# Patient Record
Sex: FEMALE | Race: WHITE | NOT HISPANIC OR LATINO | Employment: FULL TIME | URBAN - METROPOLITAN AREA
[De-identification: names, ages, dates, MRNs, and addresses within clinical notes are randomized per-mention and may not be internally consistent; named-entity substitution may affect disease eponyms.]

---

## 2021-02-28 ENCOUNTER — CLINICAL SUPPORT (OUTPATIENT)
Dept: URGENT CARE | Facility: CLINIC | Age: 21
End: 2021-02-28
Payer: COMMERCIAL

## 2021-02-28 DIAGNOSIS — U07.1 COVID-19 VIRUS DETECTED: ICD-10-CM

## 2021-02-28 DIAGNOSIS — Z20.822 EXPOSURE TO COVID-19 VIRUS: Primary | ICD-10-CM

## 2021-02-28 LAB
CTP QC/QA: YES
SARS-COV-2 RDRP RESP QL NAA+PROBE: POSITIVE

## 2021-02-28 PROCEDURE — U0002 COVID-19 LAB TEST NON-CDC: HCPCS | Mod: QW,S$GLB,, | Performed by: INTERNAL MEDICINE

## 2021-02-28 PROCEDURE — U0002: ICD-10-PCS | Mod: QW,S$GLB,, | Performed by: INTERNAL MEDICINE

## 2021-10-18 PROBLEM — N89.8 VAGINAL DISCHARGE: Status: ACTIVE | Noted: 2021-10-18

## 2021-10-18 PROBLEM — Z11.3 SCREENING FOR STD (SEXUALLY TRANSMITTED DISEASE): Status: ACTIVE | Noted: 2021-10-18

## 2021-12-01 PROBLEM — A74.9 CHLAMYDIA: Status: ACTIVE | Noted: 2021-12-01

## 2021-12-01 PROBLEM — N89.8 VAGINAL DISCHARGE: Status: RESOLVED | Noted: 2021-10-18 | Resolved: 2021-12-01

## 2022-03-21 ENCOUNTER — HOSPITAL ENCOUNTER (EMERGENCY)
Facility: HOSPITAL | Age: 22
Discharge: HOME | End: 2022-03-21
Attending: EMERGENCY MEDICINE
Payer: COMMERCIAL

## 2022-03-21 VITALS
HEART RATE: 94 BPM | OXYGEN SATURATION: 99 % | RESPIRATION RATE: 18 BRPM | DIASTOLIC BLOOD PRESSURE: 61 MMHG | HEIGHT: 59 IN | TEMPERATURE: 98 F | WEIGHT: 129.88 LBS | BODY MASS INDEX: 26.18 KG/M2 | SYSTOLIC BLOOD PRESSURE: 128 MMHG

## 2022-03-21 DIAGNOSIS — S09.90XA INJURY OF HEAD, INITIAL ENCOUNTER: Primary | ICD-10-CM

## 2022-03-21 PROCEDURE — 99283 EMERGENCY DEPT VISIT LOW MDM: CPT

## 2022-03-21 PROCEDURE — 25000003 PHARM REV CODE 250: Performed by: PHYSICIAN ASSISTANT

## 2022-03-21 RX ORDER — BUTALBITAL, ACETAMINOPHEN AND CAFFEINE 50; 325; 40 MG/1; MG/1; MG/1
2 TABLET ORAL
Status: COMPLETED | OUTPATIENT
Start: 2022-03-21 | End: 2022-03-21

## 2022-03-21 RX ORDER — METOCLOPRAMIDE 5 MG/1
10 TABLET ORAL
Status: COMPLETED | OUTPATIENT
Start: 2022-03-21 | End: 2022-03-21

## 2022-03-21 RX ORDER — BUTALBITAL, ACETAMINOPHEN AND CAFFEINE 50; 325; 40 MG/1; MG/1; MG/1
2 TABLET ORAL EVERY 8 HOURS PRN
Qty: 18 TABLET | Refills: 0 | Status: SHIPPED | OUTPATIENT
Start: 2022-03-21 | End: 2022-04-20

## 2022-03-21 RX ADMIN — BUTALBITAL, ACETAMINOPHEN AND CAFFEINE 2 TABLET: 50; 325; 40 TABLET ORAL at 12:03

## 2022-03-21 RX ADMIN — METOCLOPRAMIDE 10 MG: 5 TABLET ORAL at 12:03

## 2022-03-21 NOTE — ED PROVIDER NOTES
History      Chief Complaint   Patient presents with    Head Injury     Pt bent over to  a chair and hit head on soap dispenser. No loss of consciousness, but nauseated and dizzy.       Review of patient's allergies indicates:  No Known Allergies     HPI   HPI    3/21/2022, 12:34 PM   History obtained from the patient      History of Present Illness: Renay Nix is a 21 y.o. female patient who presents to the Emergency Department for check up since head injury .  She bent over to  a chair and hit her head on a soap dispenser.  Denies LOC and vomiting.  Admits to nausea.  Symptoms are moderate in severity.     No further complaints or concerns at this time.           PCP: Primary Doctor No       Past Medical History:  Past Medical History:   Diagnosis Date    Chlamydia          Past Surgical History:  History reviewed. No pertinent surgical history.        Family History:  Family History   Adopted: Yes   Family history unknown: Yes           Social History:  Social History     Tobacco Use    Smoking status: Never Smoker    Smokeless tobacco: Never Used   Substance and Sexual Activity    Alcohol use: Not Currently     Comment: occ    Drug use: Not Currently    Sexual activity: Yes     Partners: Male       ROS   Review of Systems   Constitutional: Negative for activity change, chills and fever.   HENT: Negative for rhinorrhea and trouble swallowing.    Eyes: Negative for pain and visual disturbance.   Respiratory: Negative for cough and shortness of breath.    Cardiovascular: Negative for chest pain.   Gastrointestinal: Negative for nausea and vomiting.   Genitourinary: Negative for dysuria.   Musculoskeletal: Negative for gait problem and neck stiffness.   Skin: Negative for pallor and rash.   Neurological: Negative for facial asymmetry, speech difficulty and weakness.   Hematological: Does not bruise/bleed easily.   All other systems reviewed and are negative.    Review of  "Systems    Physical Exam      Initial Vitals [03/21/22 1128]   BP Pulse Resp Temp SpO2   139/76 92 20 98.2 °F (36.8 °C) 99 %      MAP       --         Physical Exam  Vital signs and nursing notes reviewed.  Constitutional: Patient is in NAD. Awake and alert. Well-developed and well-nourished.  Head:  Normocephalic.  No lora sign  Eyes: PERRL. EOM intact. Conjunctivae nl. No scleral icterus.  No hyphema  ENT: Mucous membranes are moist. Oropharynx is clear.  No hematotympanum  Neck: Supple. No JVD. No lymphadenopathy.  No meningismus.  No midline ttp, +FROM  Cardiovascular: Regular rate and rhythm. No murmurs, rubs, or gallops. Distal pulses are 2+ and symmetric.  Pulmonary/Chest: No respiratory distress. Clear to auscultation bilaterally. No wheezing, rales, or rhonchi.  Abdominal: Soft. Non-distended. No TTP. No rebound, guarding, or rigidity. Good bowel sounds.  Genitourinary: No CVA tenderness  Musculoskeletal: Moves all extremities. No edema.   Skin: Warm and dry.  Neurological: Awake and alert. GCS 15.  No acute focal neurological deficits are appreciated. No facial droop.  Tongue is midline.  No pronator drift.  Finger to nose normal.  Hand  equal and strong, 5/5 motor strength x 4.   equal and strong bilaterally  Psychiatric: Normal affect. Good eye contact. Appropriate in content.      ED Course          Procedures  ED Vital Signs:  Vitals:    03/21/22 1128 03/21/22 1230 03/21/22 1330 03/21/22 1358   BP: 139/76 132/75 134/64 128/61   Pulse: 92 90 94 94   Resp: 20 18 18 18   Temp: 98.2 °F (36.8 °C) 98.2 °F (36.8 °C) 98.4 °F (36.9 °C) 98.4 °F (36.9 °C)   TempSrc: Oral  Oral Oral   SpO2: 99%      Weight: 58.9 kg (129 lb 13.6 oz)      Height: 4' 11" (1.499 m)                    Imaging Results:  Imaging Results    None            The Emergency Provider reviewed the vital signs and test results, which are outlined above.    ED Discussion             Medication(s) given in the ER:  Medications "   butalbital-acetaminophen-caffeine -40 mg per tablet 2 tablet (2 tablets Oral Given 3/21/22 1241)   metoclopramide HCl tablet 10 mg (10 mg Oral Given 3/21/22 1241)            Follow-up Information     Your Primary Care Doctor In 2 days.           [facility] - Emergency Dept..    Specialty: Emergency Medicine  Why: If symptoms worsen  Contact information:  [address]  [facility] [address] [phone]                              Medication List      START taking these medications    butalbital-acetaminophen-caffeine -40 mg -40 mg per tablet  Commonly known as: FIORICET, ESGIC  Take 2 tablets by mouth every 8 (eight) hours as needed for Headaches.           Where to Get Your Medications      These medications were sent to [facility]/pharmacy #[id number] - [place] - [address] AT [facility]  [address], [facility] [address]    Phone: [phone]   · butalbital-acetaminophen-caffeine -40 mg -40 mg per tablet             Medical Decision Making        All findings were reviewed with the patient/family in detail.  Findings seem to be most consistent with a diagnosis of head injury. Closed Head Injury precautions were discussed with patient and/or family/caretaker  Second impact syndrome was also discussed.    All remaining questions and concerns were addressed at that time.  Patient/family has been counseled regarding the need for follow-up as well as the indication to return to the emergency room should new or worrisome developments occur.        MDM               Clinical Impression:        ICD-10-CM ICD-9-CM   1. Injury of head, initial encounter  S09.90XA 959.01               Lisbeth Ferguson PA-C  03/28/22 2147

## 2022-12-02 PROBLEM — Z11.3 SCREENING FOR STD (SEXUALLY TRANSMITTED DISEASE): Status: RESOLVED | Noted: 2021-10-18 | Resolved: 2022-12-02

## 2022-12-02 PROBLEM — A74.9 CHLAMYDIA: Status: RESOLVED | Noted: 2021-12-01 | Resolved: 2022-12-02
